# Patient Record
(demographics unavailable — no encounter records)

---

## 2025-07-29 NOTE — HISTORY OF PRESENT ILLNESS
[FreeTextEntry1] : Dear Dr. Matta:   I had the pleasure of seeing your patient, ANGEL HUTCHINS, in my office today, 07/29/2025. She was referred to pediatric cardiology for fetal echocardiogram at 21w5d due to maternal diabetes. She has been doing well from a clinical standpoint. There is no family history of congenital heart disease.

## 2025-07-29 NOTE — DISCUSSION/SUMMARY
[FreeTextEntry1] : Today's fetal echocardiogram is normal. I provided reassurance and discussed results and general limitations of fetal echocardiography. Given these results, I recommend routine prenatal care and delivery. No further cardiac work up is necessary unless there are new concerns.   Please do not hesitate to contact me if you have any questions.   Pedro Martin MD, MS, FAAP, FACC Attending Physician, Pediatric Cardiology [facility] [address], [address] [place] [address] Office: [phone] Fax: [phone] Email: [email or web address]     I have spent 60 minutes of time on the encounter excluding separately reported services.